# Patient Record
Sex: MALE | Race: OTHER | HISPANIC OR LATINO
[De-identification: names, ages, dates, MRNs, and addresses within clinical notes are randomized per-mention and may not be internally consistent; named-entity substitution may affect disease eponyms.]

---

## 2022-11-23 RX ORDER — CHLORHEXIDINE GLUCONATE 213 G/1000ML
1 SOLUTION TOPICAL DAILY
Refills: 0 | Status: DISCONTINUED | OUTPATIENT
Start: 2022-11-28 | End: 2022-11-28

## 2022-11-23 NOTE — ASU PATIENT PROFILE, ADULT - FALL HARM RISK - UNIVERSAL INTERVENTIONS
Bed in lowest position, wheels locked, appropriate side rails in place/Call bell, personal items and telephone in reach/Instruct patient to call for assistance before getting out of bed or chair/Non-slip footwear when patient is out of bed/Gates to call system/Physically safe environment - no spills, clutter or unnecessary equipment/Purposeful Proactive Rounding/Room/bathroom lighting operational, light cord in reach

## 2022-11-23 NOTE — ASU PATIENT PROFILE, ADULT - VISION (WITH CORRECTIVE LENSES IF THE PATIENT USUALLY WEARS THEM):
glasses fo driving/Partially impaired: cannot see medication labels or newsprint, but can see obstacles in path, and the surrounding layout; can count fingers at arm's length

## 2022-11-23 NOTE — ASU PATIENT PROFILE, ADULT - NS PREOP UNDERSTANDS INFO
no solids after 10pm, clears allowed up to 4am, bring ID and insurance card, wear comfortable clothing, no valuables or jewelry/yes

## 2022-11-27 ENCOUNTER — TRANSCRIPTION ENCOUNTER (OUTPATIENT)
Age: 62
End: 2022-11-27

## 2022-11-28 ENCOUNTER — TRANSCRIPTION ENCOUNTER (OUTPATIENT)
Age: 62
End: 2022-11-28

## 2022-11-28 ENCOUNTER — OUTPATIENT (OUTPATIENT)
Dept: OUTPATIENT SERVICES | Facility: HOSPITAL | Age: 62
LOS: 1 days | Discharge: HOME | End: 2022-11-28

## 2022-11-28 ENCOUNTER — RESULT REVIEW (OUTPATIENT)
Age: 62
End: 2022-11-28

## 2022-11-28 VITALS
HEART RATE: 67 BPM | RESPIRATION RATE: 13 BRPM | TEMPERATURE: 97 F | OXYGEN SATURATION: 94 % | SYSTOLIC BLOOD PRESSURE: 120 MMHG | DIASTOLIC BLOOD PRESSURE: 74 MMHG

## 2022-11-28 VITALS
WEIGHT: 198.42 LBS | HEIGHT: 74 IN | OXYGEN SATURATION: 98 % | RESPIRATION RATE: 16 BRPM | DIASTOLIC BLOOD PRESSURE: 65 MMHG | TEMPERATURE: 98 F | HEART RATE: 75 BPM | SYSTOLIC BLOOD PRESSURE: 130 MMHG

## 2022-11-28 DIAGNOSIS — Z98.890 OTHER SPECIFIED POSTPROCEDURAL STATES: Chronic | ICD-10-CM

## 2022-11-28 LAB — GLUCOSE BLDC GLUCOMTR-MCNC: 130 MG/DL — HIGH (ref 70–99)

## 2022-11-28 PROCEDURE — 88302 TISSUE EXAM BY PATHOLOGIST: CPT | Mod: 26

## 2022-11-28 DEVICE — MESH HERNIA VENTRALEX ST SMALL 1.7": Type: IMPLANTABLE DEVICE | Status: FUNCTIONAL

## 2022-11-28 RX ORDER — OXYCODONE AND ACETAMINOPHEN 5; 325 MG/1; MG/1
1 TABLET ORAL
Qty: 12 | Refills: 0
Start: 2022-11-28 | End: 2022-11-30

## 2022-11-28 RX ORDER — BENZOCAINE AND MENTHOL 5; 1 G/100ML; G/100ML
1 LIQUID ORAL ONCE
Refills: 0 | Status: DISCONTINUED | OUTPATIENT
Start: 2022-11-28 | End: 2022-11-28

## 2022-11-28 RX ORDER — ONDANSETRON 8 MG/1
4 TABLET, FILM COATED ORAL ONCE
Refills: 0 | Status: DISCONTINUED | OUTPATIENT
Start: 2022-11-28 | End: 2022-11-28

## 2022-11-28 RX ORDER — DOCUSATE SODIUM 100 MG
1 CAPSULE ORAL
Qty: 28 | Refills: 0
Start: 2022-11-28 | End: 2022-12-11

## 2022-11-28 RX ORDER — FENTANYL CITRATE 50 UG/ML
50 INJECTION INTRAVENOUS
Refills: 0 | Status: DISCONTINUED | OUTPATIENT
Start: 2022-11-28 | End: 2022-11-28

## 2022-11-28 RX ORDER — AMLODIPINE BESYLATE AND BENAZEPRIL HYDROCHLORIDE 10; 20 MG/1; MG/1
1 CAPSULE ORAL
Qty: 0 | Refills: 0 | DISCHARGE

## 2022-11-28 RX ORDER — CLONAZEPAM 1 MG
0 TABLET ORAL
Qty: 0 | Refills: 0 | DISCHARGE

## 2022-11-28 RX ORDER — ACETAMINOPHEN 500 MG
1000 TABLET ORAL ONCE
Refills: 0 | Status: COMPLETED | OUTPATIENT
Start: 2022-11-28 | End: 2022-11-28

## 2022-11-28 RX ORDER — METOPROLOL TARTRATE 50 MG
0 TABLET ORAL
Qty: 0 | Refills: 0 | DISCHARGE

## 2022-11-28 RX ORDER — HYDROMORPHONE HYDROCHLORIDE 2 MG/ML
0.2 INJECTION INTRAMUSCULAR; INTRAVENOUS; SUBCUTANEOUS
Refills: 0 | Status: DISCONTINUED | OUTPATIENT
Start: 2022-11-28 | End: 2022-11-28

## 2022-11-28 RX ORDER — METFORMIN HYDROCHLORIDE 850 MG/1
1 TABLET ORAL
Qty: 0 | Refills: 0 | DISCHARGE

## 2022-11-28 RX ORDER — OXYCODONE HYDROCHLORIDE 5 MG/1
5 TABLET ORAL ONCE
Refills: 0 | Status: DISCONTINUED | OUTPATIENT
Start: 2022-11-28 | End: 2022-11-28

## 2022-11-28 RX ORDER — APIXABAN 2.5 MG/1
1 TABLET, FILM COATED ORAL
Qty: 0 | Refills: 0 | DISCHARGE

## 2022-11-28 RX ORDER — SODIUM CHLORIDE 9 MG/ML
1000 INJECTION, SOLUTION INTRAVENOUS
Refills: 0 | Status: DISCONTINUED | OUTPATIENT
Start: 2022-11-28 | End: 2022-11-28

## 2022-11-28 RX ORDER — HYDROMORPHONE HYDROCHLORIDE 2 MG/ML
0.4 INJECTION INTRAMUSCULAR; INTRAVENOUS; SUBCUTANEOUS
Refills: 0 | Status: DISCONTINUED | OUTPATIENT
Start: 2022-11-28 | End: 2022-11-28

## 2022-11-28 RX ORDER — SODIUM CHLORIDE 9 MG/ML
500 INJECTION, SOLUTION INTRAVENOUS
Refills: 0 | Status: DISCONTINUED | OUTPATIENT
Start: 2022-11-28 | End: 2022-11-28

## 2022-11-28 RX ADMIN — SODIUM CHLORIDE 100 MILLILITER(S): 9 INJECTION, SOLUTION INTRAVENOUS at 10:05

## 2022-11-28 RX ADMIN — FENTANYL CITRATE 50 MICROGRAM(S): 50 INJECTION INTRAVENOUS at 10:15

## 2022-11-28 RX ADMIN — CHLORHEXIDINE GLUCONATE 1 APPLICATION(S): 213 SOLUTION TOPICAL at 06:10

## 2022-11-28 RX ADMIN — OXYCODONE HYDROCHLORIDE 5 MILLIGRAM(S): 5 TABLET ORAL at 12:25

## 2022-11-28 RX ADMIN — Medication 400 MILLIGRAM(S): at 12:43

## 2022-11-28 RX ADMIN — OXYCODONE HYDROCHLORIDE 5 MILLIGRAM(S): 5 TABLET ORAL at 11:52

## 2022-11-28 RX ADMIN — Medication 1000 MILLIGRAM(S): at 13:30

## 2022-11-28 RX ADMIN — FENTANYL CITRATE 50 MICROGRAM(S): 50 INJECTION INTRAVENOUS at 10:30

## 2022-11-28 NOTE — ASU DISCHARGE PLAN (ADULT/PEDIATRIC) - NS MD DC FALL RISK RISK
From: Glenn Crook  To: Milo Schmidt  Sent: 11/3/2022 12:16 PM CDT  Subject: Medication change?    Dr Brayan Barillas received from the insurance company a request to change from Vyvanse 30mg to Dextroamphetamine Sulfate 15 mg. The savings is over $3500 a year. What are your feelings on this change?  
Ok to try alternative, based on current dosing recommend beginning with 5 mg extended release daily.    Should follow-up in office in about 4 weeks to see how he is handling medication change. May take some titration, please update office in two weeks regarding effectiveness. New Rx sent.  
Patient informed via the portal  
Patient was last seen 8-1-2022 due again 2-2023 please advise on patient message(mom)  
Resent Vyvanse d/t adderall back-order. Please keep us posted if there is a change.  
For information on Fall & Injury Prevention, visit: [email or web address] OR  [email or web address] OR  https://www.cdc.gov/steadi/patient.html

## 2022-11-28 NOTE — BRIEF OPERATIVE NOTE - NSICDXBRIEFPROCEDURE_GEN_ALL_CORE_FT
PROCEDURES:  Repair, hernia, ventral, open, using mesh, adult 28-Nov-2022 09:35:29  Wendy Guerrero

## 2022-11-28 NOTE — ASU DISCHARGE PLAN (ADULT/PEDIATRIC) - ASU DC SPECIAL INSTRUCTIONSFT
Follow up with Dr. Wright in 1-2 weeks. Call the office at the number below to schedule your appointment. You may shower; soap and water over incision sites. Do not scrub. Pat dry when done. No tub bathing or swimming until cleared. Keep incision sites out of the sun as scars will darken. Ambulate as tolerated, but no heavy lifting (>10lbs) or strenuous exercise. You may resume regular diet. You should be urinating at least 3-4x per day. Call the office if you experience increasing abdominal pain, nausea, vomiting, or temperature >101 F.    **You may resume Eliquis on 12/1**    Take 2 tabs tylenol every 6 hours as needed for pain management. You have also been prescribed percocet for pain not controlled by tylenol. Take 2 tabs as prescribed instead of tylenol for severe pain. Take prescribed stool softener (colace) to prevent constipation caused by percocet.

## 2022-11-28 NOTE — ASU DISCHARGE PLAN (ADULT/PEDIATRIC) - CARE PROVIDER_API CALL
John Wright (DO)  Surgery  [address] [place] [address], [address]  [place] [address]  Phone: [phone]  Fax: [phone]  Established Patient  Follow Up Time: 1 week

## 2022-11-28 NOTE — BRIEF OPERATIVE NOTE - OPERATION/FINDINGS
3cm circumumbilical incision carried down to sac and liberated circumferentially with blunt and electrocautery dissection. 2cm fascia defect appreciated, containing omentum. Hernia sac open and hernia sac content ligated and amputated with good hemostasis. Remaining stalk reduced into abdomen, adhesions free from abdominal was circumferentially. Small Ventralex ST mesh placed above peritoneum and secured with Novofil 0 x2. Fascia reapproxmiated over mesh with figure of eight Novofil 2-0 x4. Skin reapproximated with interrupted vicryl and running monocryl.

## 2022-12-04 LAB — SURGICAL PATHOLOGY STUDY: SIGNIFICANT CHANGE UP

## (undated) DEVICE — DRAPE TOWEL BLUE 17" X 24"

## (undated) DEVICE — VENODYNE/SCD SLEEVE CALF MEDIUM

## (undated) DEVICE — SUT NOVAFIL 0 30" T-19

## (undated) DEVICE — GLV 8 PROTEXIS (WHITE)

## (undated) DEVICE — DRSG DERMABOND 0.7ML

## (undated) DEVICE — SUT NOVAFIL 2-0 30" T-19

## (undated) DEVICE — SUT MONOCRYL 4-0 18" PS-2

## (undated) DEVICE — SUT VICRYL 2-0 27" SH

## (undated) DEVICE — SUCTION YANKAUER FLEXIBE HI CAPACITY NO VENT

## (undated) DEVICE — GOWN XL

## (undated) DEVICE — ABDOMINAL BINDER MED/LG 9" X 36"-64"

## (undated) DEVICE — POSITIONER FOAM EGG CRATE ULNAR 2PCS (PINK)

## (undated) DEVICE — APPLICATOR Q TIP 6" WOOD STEM

## (undated) DEVICE — DRSG TEGADERM 4X4.75"

## (undated) DEVICE — PACK GENERAL MINOR

## (undated) DEVICE — SUT VICRYL 3-0 27" SH

## (undated) DEVICE — SUT VICRYL 3-0 18" PS-2 UNDYED

## (undated) DEVICE — DRSG GAUZE MOISTURIZER 0.5 OZ 4X8

## (undated) DEVICE — WARMING BLANKET UPPER ADULT

## (undated) DEVICE — MARKING PEN W RULER

## (undated) DEVICE — SUT VICRYL 2-0 18" TIES

## (undated) DEVICE — DRAPE IOBAN 23" X 23"